# Patient Record
Sex: MALE | Race: WHITE
[De-identification: names, ages, dates, MRNs, and addresses within clinical notes are randomized per-mention and may not be internally consistent; named-entity substitution may affect disease eponyms.]

---

## 2017-07-11 ENCOUNTER — HOSPITAL ENCOUNTER (EMERGENCY)
Dept: HOSPITAL 45 - C.EDB | Age: 52
Discharge: HOME | End: 2017-07-11
Payer: COMMERCIAL

## 2017-07-11 VITALS
HEIGHT: 70.98 IN | BODY MASS INDEX: 39.91 KG/M2 | BODY MASS INDEX: 39.91 KG/M2 | HEIGHT: 70.98 IN | WEIGHT: 285.06 LBS | WEIGHT: 285.06 LBS

## 2017-07-11 VITALS
DIASTOLIC BLOOD PRESSURE: 92 MMHG | TEMPERATURE: 98.24 F | HEART RATE: 71 BPM | SYSTOLIC BLOOD PRESSURE: 171 MMHG | OXYGEN SATURATION: 96 %

## 2017-07-11 DIAGNOSIS — J32.9: Primary | ICD-10-CM

## 2017-07-11 DIAGNOSIS — Z82.49: ICD-10-CM

## 2017-07-11 DIAGNOSIS — Z83.6: ICD-10-CM

## 2017-07-11 DIAGNOSIS — K21.9: ICD-10-CM

## 2017-07-11 LAB
ALBUMIN/GLOB SERPL: 1.1 {RATIO} (ref 0.9–2)
ALP SERPL-CCNC: 83 U/L (ref 45–117)
ALT SERPL-CCNC: 85 U/L (ref 12–78)
ANION GAP SERPL CALC-SCNC: 6 MMOL/L (ref 3–11)
AST SERPL-CCNC: 55 U/L (ref 15–37)
BASOPHILS # BLD: 0.02 K/UL (ref 0–0.2)
BASOPHILS NFR BLD: 0.4 %
BUN SERPL-MCNC: 10 MG/DL (ref 7–18)
BUN/CREAT SERPL: 13.1 (ref 10–20)
CALCIUM SERPL-MCNC: 8.8 MG/DL (ref 8.5–10.1)
CHLORIDE SERPL-SCNC: 106 MMOL/L (ref 98–107)
CO2 SERPL-SCNC: 29 MMOL/L (ref 21–32)
COMPLETE: YES
CREAT CL PREDICTED SERPL C-G-VRATE: 151.6 ML/MIN
CREAT SERPL-MCNC: 0.79 MG/DL (ref 0.6–1.4)
EOSINOPHIL NFR BLD AUTO: 252 K/UL (ref 130–400)
GLOBULIN SER-MCNC: 3.4 GM/DL (ref 2.5–4)
GLUCOSE SERPL-MCNC: 98 MG/DL (ref 70–99)
HCT VFR BLD CALC: 43.6 % (ref 42–52)
IG%: 1 %
IMM GRANULOCYTES NFR BLD AUTO: 25.1 %
LYME DISEASE AB IGG: (no result)
LYME DISEASE AB IGM: (no result)
LYMPHOCYTES # BLD: 1.32 K/UL (ref 1.2–3.4)
MAGNESIUM SERPL-MCNC: 2.1 MG/DL (ref 1.8–2.4)
MCH RBC QN AUTO: 28.8 PG (ref 25–34)
MCHC RBC AUTO-ENTMCNC: 33.3 G/DL (ref 32–36)
MCV RBC AUTO: 86.7 FL (ref 80–100)
MONOCYTES NFR BLD: 11 %
NEUTROPHILS # BLD AUTO: 5.7 %
NEUTROPHILS NFR BLD AUTO: 56.8 %
PMV BLD AUTO: 10.2 FL (ref 7.4–10.4)
POTASSIUM SERPL-SCNC: 4 MMOL/L (ref 3.5–5.1)
RBC # BLD AUTO: 5.03 M/UL (ref 4.7–6.1)
SODIUM SERPL-SCNC: 141 MMOL/L (ref 136–145)
WBC # BLD AUTO: 5.26 K/UL (ref 4.8–10.8)

## 2017-07-11 NOTE — DIAGNOSTIC IMAGING REPORT
CT HEAD WITHOUT CONTRAST (CT)



CLINICAL HISTORY: Headaches behind R eye since Thursday    



COMPARISON STUDY:  No previous studies for comparison.



TECHNIQUE:  Axial CT of the brain is performed from the vertex to the skull

base. IV contrast was not administered for this examination.



CT DOSE: 537.48 mGy.cm



FINDINGS:



No intra or extra-axial mass lesions are visualized. There is no CT evidence of

acute cortical infarction. There is no evidence of midline shift. There is no

acute  hemorrhage. No calvarial fractures are visualized. 

   

There is no evidence of pathologic ventricular dilatation.

There is complete opacification the right frontal sinus. There is moderate

mucosal thickening within the left maxillary sinus. Multiple ethmoid air cells

are opacified. There is bilateral maxillary sinus mucosal thickening left

greater than right.



IMPRESSION: Paranasal sinus disease. Otherwise normal noncontrast head CT for

age.







Electronically signed by:  Gilbert Lim M.D.

7/11/2017 5:18 PM



Dictated Date/Time:  7/11/2017 5:16 PM

## 2017-07-11 NOTE — EMERGENCY ROOM VISIT NOTE
History


First contact with patient:  16:27


Chief Complaint:  HEADACHE


Stated Complaint:  HEADACHE





History of Present Illness


The patient is a 51 year old male who presents to the Emergency Room with 

complaints of severe headaches for the last 5 days.  The headaches are 

throbbing sensation behind his right eye.  The patient does not typically get 

headaches.  He reports getting maybe one per year up until a few days ago.  The 

patient has tried Excedrin, Tylenol and ibuprofen with minimal relief of the 

pain.  He denies any photophobia.  He denies any nausea or vomiting.  No neck 

pain.  The headaches do seem to improve when he lays flat.  They don't seem to 

be exacerbated with movement.  He denies any dizziness.  No recent changes in 

medications.  He reports eating and drinking normally over the last several 

days.  The patient was seen at urgent care yesterday.  He was given a shot of 

Toradol with fairly good pain relief.  He denies any recent illnesses such as 

fever, chills, cough, sinus pain or sore throat.





Review of Systems


10 system review performed and  negative unless noted in HPI or below





Past Medical/Surgical History


GERD





Family History


Emphysema


Hypertension





Social History


Smoking Status:  Never Smoker


Alcohol Use:  none


Marital Status:  


Housing Status:  lives with family


Occupation Status:  employed





Current/Historical Medications


Scheduled


Amoxicillin & Pot Clavulanate (Augmentin 875-125 mg), 1 TAB PO BID


Omeprazole (Prilosec), 40 MG PO DAILY


Paroxetine (Paxil), 40 MG PO DAILY





Scheduled PRN


Fluticasone Prop/Salmeterol (Advair Diskus 250/50 60 Dose), 1 PUFF INH BID PRN 

for Wheezing


Hydrocodone/Acetaminophen 5MG/325MG (Norco 5MG/325MG), 2 TABLETS PO Q4H PRN for 

Pain


Terazosin Hcl (Hytrin), 2 MG PO AS DIRECTED PRN for URINARY PROBLEMS





Allergies


Coded Allergies:  


     No Known Allergies (Verified , 7/11/17)





Physical Exam


Vital Signs











  Date Time  Temp Pulse Resp B/P (MAP) Pulse Ox O2 Delivery O2 Flow Rate FiO2


 


7/11/17 19:09 36.8 71 18 171/92 96   


 


7/11/17 17:47  74 18 142/81 98   


 


7/11/17 16:04 36.8 82 18 147/101 98 Room Air  











Physical Exam


VITALS: Vitals are noted on the nurse's note and reviewed by myself.  Vital 

signs stable.


GENERAL: 51-year-old male, in no acute distress, nondiaphoretic, well-developed 

well-nourished.


SKIN: The skin was without rashes, erythema, edema, or bruising. 


HEAD: Normocephalic atraumatic.  


EARS: External auditory canals clear, tympanic membranes pearly gray without 

erythema or effusion bilaterally.


EYES: Pupils equal round and reactive to light and accommodation.  Conjunctivae 

without injection, sclerae without icterus.  Extraocular movements intact.  


MOUTH: Mucous membranes moist.  Tonsils are not enlarged.  Pharynx without 

erythema or exudate.  Uvula midline.  Airway patent.  Tongue does not deviate.  


NECK: Supple without nuchal rigidity.  No lymphadenopathy. Cervical spine is 

nontender.  No JVD.


HEART: Regular rate and rhythm without murmurs gallops or rubs.


LUNGS: Clear to auscultation bilaterally without wheezes, rales or rhonchi.   

No accessory muscle use.


ABDOMEN: Positive bowel sounds x 4.Soft, nontender, without organomegaly. No 

guarding or rebound tenderness.


MUSCULOSKELETAL: No muscle atrophy, erythema, or edema noted. Strength 5/5 

throughout.


NEURO: Patient was alert and oriented to person place and time.  cerebellar 

functions in tact.  Normal sensation to touch. No focal neurological deficits.





Medical Decision & Procedures


ER Provider


Diagnostic Interpretation:





                                                                               

                                                                 


Patient Name: MARK CASTRO                               Unit Number:  

[id number]                  


 








 











Dictated: 07/11/17 1716


 


Transcribed: 07/11/17 1716


 


ARG


 


Printed Date/Time: [~ rep prt dt]/[~ rep prt tm]








 [~ rep ct labl] - [~ rep ct ivnm]


 





   [facility]


 Radiology Department


 [place] [address] [phone]





 











Dictated: 07/11/17 1716


 


Transcribed: 07/11/17 1716


 


ARG


 


Printed Date/Time: [~ rep prt dt]/[~ rep prt tm]








 [~ rep ct labl] - [~ rep ct ivnm]


 











Patient:  MARK CASTRO Address1:  [address] Rec:  [id number] Address2:  


 


Acct ID:  [id number] [facility] Zip:  [place] [address]


 


YOB: 1965          Sex:  M Room/Bed:  


 


Ref Phy:  Hilario Griffin M.D. SC: ERICA Ocampo Phy:   Report #:  [id number]


 


Jennifer Phy:  Hilario Griffin M.D. Test:  HWO


 


Admit Phy:   Technician:  SOFÍA


 


Interpreting Phy:  Gilbert Lim M.D. Diagnosis:  HEADACHE


 


Ordering Phy:  Virginia Kilgore PA-C Service Date:  07/11/17


 


Admit Date: 07/11/17 MNE: [id number]


 


 CONF:


 


 DICTATED BY: Gilbert Lim M.D.]]


 


CC: Jatinder Tamez M.D. Gabinskiy, Boris M.D. Urban, Angela P., PA-C


 


 Endcc:











[~ rep ct add3]]


CT HEAD WITHOUT CONTRAST (CT)





CLINICAL HISTORY: Headaches behind R eye since Thursday    





COMPARISON STUDY:  No previous studies for comparison.





TECHNIQUE:  Axial CT of the brain is performed from the vertex to the skull


base. IV contrast was not administered for this examination.





CT DOSE: 537.48 mGy.cm





FINDINGS:





No intra or extra-axial mass lesions are visualized. There is no CT evidence of


acute cortical infarction. There is no evidence of midline shift. There is no


acute  hemorrhage. No calvarial fractures are visualized. 


   


There is no evidence of pathologic ventricular dilatation.


There is complete opacification the right frontal sinus. There is moderate


mucosal thickening within the left maxillary sinus. Multiple ethmoid air cells


are opacified. There is bilateral maxillary sinus mucosal thickening left


greater than right.





IMPRESSION: Paranasal sinus disease. Otherwise normal noncontrast head CT for


age.











Electronically signed by:  Gilbert Lim M.D.


7/11/2017 5:18 PM





Dictated Date/Time:  7/11/2017 5:16 PM





 The status of this report is Signed.   


 Draft = Not yet reviewed or approved by Radiologist.  


 Signed = Reviewed and approved by Radiologist.


<AttendingPhy></AttendingPhy> <FamilyPhy>Hilario Griffin M.D.</FamilyPhy> <

PrimaryPhy>Hilario Griffin M.D.</PrimaryPhy> <UnitNumber>[id number]</UnitNumber

> <VisitNumber>[id number]</VisitNumber> <PatientName>MARK CASTRO</

PatientName> <DateOfBirth>1965</DateOfBirth> <Location>[facility]</Location> <

ServiceDate>07/11/17</ServiceDate> <MNE>ESINDI</MNE> <OrderingPhy>JameVirginia 

SREE FENTON</OrderingPhy> <OrderingPhyMNE>f rep ord dr parikh</OrderingPhyMNE> <

DictatingPhyMNE>f rep dict dr parikh</DictatingPhyMNE> <CCListMNE>f rep ct mne</

CCListMNE> <AdmittingPhyMNE>f pt admit dr parikh</AdmittingPhyMNE> <AttendingPhyMNE

>f pt attend dr parikh</AttendingPhyMNE>


<ConsultingPhyMNE>f pt consult dr parikh</ConsultingPhyMNE> <FamilyPhyMNE>f pt fam 

dr parikh</FamilyPhyMNE> <OtherPhyMNE>f pt other dr parikh</OtherPhyMNE> <

PrimaryPhyMNE>f pt prim care dr parikh</PrimaryPhyMNE> <ReferringPhyMNE>f pt 

referring dr parikh</ReferringPhyMNE>





Laboratory Results


7/11/17 16:45








Red Blood Count 5.03, Mean Corpuscular Volume 86.7, Mean Corpuscular Hemoglobin 

28.8, Mean Corpuscular Hemoglobin Concent 33.3, Mean Platelet Volume 10.2, 

Neutrophils (%) (Auto) 56.8, Lymphocytes (%) (Auto) 25.1, Monocytes (%) (Auto) 

11.0, Eosinophils (%) (Auto) 5.7, Basophils (%) (Auto) 0.4, Neutrophils # (Auto

) 2.99, Lymphocytes # (Auto) 1.32, Monocytes # (Auto) 0.58, Eosinophils # (Auto

) 0.30, Basophils # (Auto) 0.02





7/11/17 16:45

















Test


  7/11/17


16:45


 


White Blood Count


  5.26 K/uL


(4.8-10.8)


 


Red Blood Count


  5.03 M/uL


(4.7-6.1)


 


Hemoglobin


  14.5 g/dL


(14.0-18.0)


 


Hematocrit 43.6 % (42-52) 


 


Mean Corpuscular Volume


  86.7 fL


()


 


Mean Corpuscular Hemoglobin


  28.8 pg


(25-34)


 


Mean Corpuscular Hemoglobin


Concent 33.3 g/dl


(32-36)


 


Platelet Count


  252 K/uL


(130-400)


 


Mean Platelet Volume


  10.2 fL


(7.4-10.4)


 


Neutrophils (%) (Auto) 56.8 % 


 


Lymphocytes (%) (Auto) 25.1 % 


 


Monocytes (%) (Auto) 11.0 % 


 


Eosinophils (%) (Auto) 5.7 % 


 


Basophils (%) (Auto) 0.4 % 


 


Neutrophils # (Auto)


  2.99 K/uL


(1.4-6.5)


 


Lymphocytes # (Auto)


  1.32 K/uL


(1.2-3.4)


 


Monocytes # (Auto)


  0.58 K/uL


(0.11-0.59)


 


Eosinophils # (Auto)


  0.30 K/uL


(0-0.5)


 


Basophils # (Auto)


  0.02 K/uL


(0-0.2)


 


RDW Standard Deviation


  41.6 fL


(36.4-46.3)


 


RDW Coefficient of Variation


  13.1 %


(11.5-14.5)


 


Immature Granulocyte % (Auto) 1.0 % 


 


Immature Granulocyte # (Auto)


  0.05 K/uL


(0.00-0.02)


 


Anion Gap


  6.0 mmol/L


(3-11)


 


Est Creatinine Clear Calc


Drug Dose 151.6 ml/min 


 


 


Estimated GFR (


American) 120.5 


 


 


Estimated GFR (Non-


American 104.0 


 


 


BUN/Creatinine Ratio 13.1 (10-20) 


 


Calcium Level


  8.8 mg/dl


(8.5-10.1)


 


Magnesium Level


  2.1 mg/dl


(1.8-2.4)


 


Total Bilirubin


  0.3 mg/dl


(0.2-1)


 


Aspartate Amino Transf


(AST/SGOT) 55 U/L (15-37) 


 


 


Alanine Aminotransferase


(ALT/SGPT) 85 U/L (12-78) 


 


 


Alkaline Phosphatase


  83 U/L


()


 


Total Protein


  7.1 gm/dl


(6.4-8.2)


 


Albumin


  3.7 gm/dl


(3.4-5.0)


 


Globulin


  3.4 gm/dl


(2.5-4.0)


 


Albumin/Globulin Ratio 1.1 (0.9-2) 


 


Lipase


  89 U/L


()


 


Chemistry Specimen Hemolysis  


 


Lyme Disease IgG Antibody NEG (NEG) 


 


Lyme Disease IgM Antibody NEG (NEG) 











Medications Administered











 Medications


  (Trade)  Dose


 Ordered  Sig/Maria De Jesus


 Route  Start Time


 Stop Time Status Last Admin


Dose Admin


 


 Ketorolac


 Tromethamine


  (Toradol Inj)  30 mg  NOW  STAT


 IV  7/11/17 16:37


 7/11/17 16:39 DC 7/11/17 16:37


30 MG


 


 Sodium Chloride  1,000 ml @ 


 999 mls/hr  Q1H1M ONCE


 IV  7/11/17 16:45


 7/11/17 17:45 DC 7/11/17 16:45


999 MLS/HR


 


 Amoxicillin/


 Clavulanate


 Potassium


  (Augmentin Tab)  875 mg  NOW  ONCE


 PO  7/11/17 18:15


 7/11/17 18:16 DC 7/11/17 18:24


875 MG


 


 Morphine Sulfate


  (MoRPHine


 SULFATE INJ)  4 mg  ONE  STAT


 IV  7/11/17 18:05


 7/11/17 18:08 DC 7/11/17 18:24


4 MG


 


 Ondansetron HCl


  (Zofran Inj)  4 mg  NOW  STAT


 IV  7/11/17 18:05


 7/11/17 18:08 DC 7/11/17 18:24


4 MG











ED Course


Patient was seen and examined


Vital signs including  blood pressure were reviewed.  The patient's blood 

pressure was noted to be elevated.  I recommended that he follow-up with his 

primary care physician to recheck his blood pressure


medications list was verified with patient


Labs were obtained, and a saline lock was established


The patient was given 1 dose of Toradol 30 mg IV.  He was also hydrated with 1 

L of normal saline.


Imaging was performed and reviewed.


Upon reevaluation, the patient was still complaining of 6/10 pain.  He was 

given morphine 4 mg IV and Zofran 4 mg IV.  He was also given 1 dose of 

Augmentin by mouth


Upon reevaluation, the patient was feeling much better.  We discussed the 

results of his workup.  He voiced understanding.  He was comfortable being 

discharged home.


I reviewed discharge instructions the patient.  They voiced understanding and 

had no further questions.





Medical Decision


Differential includes: Acute intracranial bleed, trauma, meningitis, 

encephalitis, increased intracranial pressure, mass or mass effect, facial or 

dental infection, temporal arteritis, CVA, TIA, acute hypertensive emergency, 

sinusitis, carbon monoxide exposure.





This patient is a pleasant 51-year-old male that presented to the emergency 

department with complaints of frequent headaches over the last 4-5 days.  The 

patient had no other complaints of infectious etiology.  He was afebrile.  

Since headaches are unusual for him, I elected to perform a CT of the head.  He 

has fairly significant sinusitis.  There are no signs of intracranial 

hemorrhage.  I believe his pain is likely secondary to his sinusitis.  He was 

started on Augmentin.  He was also given a short course of narcotics.  He was 

instructed to follow-up with his primary care physician within one week for 

recheck.  He agreed to return to the emergency department with any new or 

worsening symptoms.  He was also informed to discuss his blood pressure at his 

next primary care doctor's appointment.





Impression





 Primary Impression:  


 Sinusitis





Departure Information


Prescriptions





Amoxicillin & Pot Clavulanate (Augmentin 875-125 mg) 1 Tab Tab


1 TAB PO BID, #20 TAB


   Prov: Virginia Kilgore PA-C         7/11/17 


Hydrocodone/Acetaminophen 5MG/325MG (Norco 5MG/325MG)  Tab


2 TABLETS PO Q4H Y for Pain, #15 TAB


   Prov: Virginia Kilgore PA-C         7/11/17





Referrals


Hilario Griffin M.D. (PCP)





Patient Instructions


My [facility]

## 2017-09-07 ENCOUNTER — HOSPITAL ENCOUNTER (OUTPATIENT)
Dept: HOSPITAL 45 - C.RDSM | Age: 52
Discharge: HOME | End: 2017-09-07
Attending: PHYSICIAN ASSISTANT
Payer: COMMERCIAL

## 2017-09-07 DIAGNOSIS — R52: Primary | ICD-10-CM

## 2017-09-07 NOTE — DIAGNOSTIC IMAGING REPORT
AP STANDING VIEW OF BOTH KNEES; 3 VIEWS RIGHT KNEE



CLINICAL HISTORY: Chronic right knee pain.



FINDINGS: An AP standing view of both knees with lateral, tunnel, and sunrise

views of the right knee are compared to study dated 4/13/2015. The skeletal

structures are osteopenic. No fracture is seen. There is moderate to advanced

tricompartmental degenerative joint space narrowing. This is greatest in the

medial and patellofemoral compartments. There are large marginal osteophytes and

patellar enthesophytes. There is no osteochondral defect seen on the tunnel

image. A small joint effusion is suspected. Prepatellar soft tissue swelling is

noted. Survey images of the left knee on the frontal view show moderate

narrowing in the medial compartment and mild narrowing the lateral compartment

as well as marginal osteophytes.



IMPRESSION:



1. Small joint effusion and soft tissue swelling with no acute bony abnormality

seen in the right knee.



2. Osteopenia and arthritic change as above. This has modestly progressed from

the 2015 examination.



3. Arthritic change is also seen in the left knee on the frontal view.







Electronically signed by:  Jatinder Salazar M.D.

9/7/2017 4:02 PM



Dictated Date/Time:  9/7/2017 3:59 PM

## 2017-12-21 LAB
BASOPHILS # BLD: 0.03 K/UL (ref 0–0.2)
BASOPHILS NFR BLD: 0.5 %
BUN SERPL-MCNC: 20 MG/DL (ref 7–18)
CALCIUM SERPL-MCNC: 8.9 MG/DL (ref 8.5–10.1)
CO2 SERPL-SCNC: 27 MMOL/L (ref 21–32)
CREAT SERPL-MCNC: 0.67 MG/DL (ref 0.6–1.4)
EOS ABS #: 0.26 K/UL (ref 0–0.5)
EOSINOPHIL NFR BLD AUTO: 233 K/UL (ref 130–400)
GLUCOSE SERPL-MCNC: 90 MG/DL (ref 70–99)
HCT VFR BLD CALC: 42 % (ref 42–52)
HGB BLD-MCNC: 14.1 G/DL (ref 14–18)
IG#: 0.04 K/UL (ref 0–0.02)
IMM GRANULOCYTES NFR BLD AUTO: 21.9 %
INR PPP: 1 (ref 0.9–1.1)
LYMPHOCYTES # BLD: 1.24 K/UL (ref 1.2–3.4)
MCH RBC QN AUTO: 29.3 PG (ref 25–34)
MCHC RBC AUTO-ENTMCNC: 33.6 G/DL (ref 32–36)
MCV RBC AUTO: 87.3 FL (ref 80–100)
MONO ABS #: 0.48 K/UL (ref 0.11–0.59)
MONOCYTES NFR BLD: 8.5 %
NEUT ABS #: 3.62 K/UL (ref 1.4–6.5)
NEUTROPHILS # BLD AUTO: 4.6 %
NEUTROPHILS NFR BLD AUTO: 63.8 %
PMV BLD AUTO: 10.6 FL (ref 7.4–10.4)
POTASSIUM SERPL-SCNC: 4 MMOL/L (ref 3.5–5.1)
PTT PATIENT: 27.6 SECONDS (ref 21–31)
RED CELL DISTRIBUTION WIDTH CV: 13 % (ref 11.5–14.5)
RED CELL DISTRIBUTION WIDTH SD: 41.8 FL (ref 36.4–46.3)
SODIUM SERPL-SCNC: 137 MMOL/L (ref 136–145)
WBC # BLD AUTO: 5.67 K/UL (ref 4.8–10.8)

## 2017-12-21 NOTE — PAT MEDICATION INSTRUCTIONS
Service Date


Dec 21, 2017.





Current Home Medication List


Fluticasone Prop/Salmeterol (Advair Diskus 250/50 60 Dose), 1 PUFF INH BID PRN 

for Wheezing


Omeprazole (Prilosec), 40 MG PO HS


Paroxetine (Paxil), 40 MG PO HS


Potassium (Potassium), 1 TAB PO HS PRN for PRN





Medication Instructions


For Your Scheduled Surgery 








- Hold the following medications the morning of surgery:


Potassium (Potassium), 1 TAB PO HS PRN for PRN





- Take the following medications the morning of surgery:


Fluticasone Prop/Salmeterol (Advair Diskus 250/50 60 Dose), 1 PUFF INH BID PRN 

for Wheezing (if needed)





- Take the following medications as scheduled the night before surgery:


Potassium (Potassium), 1 TAB PO HS PRN for PRN


Paroxetine (Paxil), 40 MG PO HS


Fluticasone Prop/Salmeterol (Advair Diskus 250/50 60 Dose), 1 PUFF INH BID PRN 

for Wheezing (if needed)


Omeprazole (Prilosec), 40 MG PO HS








If you have any questions please call us at [phone] or [phone] or 

[phone]

## 2017-12-21 NOTE — DIAGNOSTIC IMAGING REPORT
CHEST 2 VIEWS ROUTINE



CLINICAL HISTORY: 52 years-old Male presenting with preadmission chest x-ray,

asymptomatic. 



TECHNIQUE: PA and lateral views of the chest were obtained.



COMPARISON: None.



FINDINGS:

Cardiomediastinal silhouette normal. Lungs and pleural spaces clear.

Degenerative changes of the thoracic spine. Mildly exaggerated thoracic kyphosis

without focal compression deformity. Upper abdomen normal.



IMPRESSION:

1.  No acute cardiopulmonary disease.







Electronically signed by:  Aguilar Rodriguez M.D.

12/21/2017 3:29 PM



Dictated Date/Time:  12/21/2017 3:29 PM

## 2017-12-27 NOTE — HISTORY & PHYSICAL EXAMINATION
DATE OF ADMISSION:  01/03/2018

 

HISTORY OF PRESENT ILLNESS:  This 52-year-old white male presents to the

office with complaints of right knee pain for several years.  Pain has become

worse with time.  He initially did well with viscosupplementation, but it did

not help his pain after this last series.  He is now frustrated.  He is

having night pain.  He is having difficulty with activities of daily living. 

Pain is worse with ambulation.  No swelling, catching, locking, or buckling. 

Preoperative imaging has been obtained.  He elects to proceed with right

total knee arthroplasty in hopes of alleviating his pain.

 

PAST MEDICAL HISTORY:  Significant for  elevated cholesterol, obesity, GERD,

kidney stones, BPH.

 

PREVIOUS SURGERIES:  Right shoulder arthroscopy 2000.

 

SOCIAL HISTORY:  The patient is employed.  .  No tobacco use,

occasional ETOH use.

 

FAMILY HISTORY:  Noncontributory.

 

ALLERGIES:  NKDA.

 

CURRENT MEDICATIONS:  Omeprazole 20 mg 2 tablets daily, paroxetine 40 mg p.o.

daily, Tylenol p.r.n.

 

REVIEW OF SYSTEMS:  Significant for above stated conditions, otherwise

unremarkable.

 

PHYSICAL EXAMINATION:

GENERAL:  Well-developed, well-nourished middle aged white male in no acute

distress.  Sitting on a chair.  Alert and oriented.

SKIN:  Warm and dry with good turgor.  No rashes or lesions.  No ecchymosis

or erythema.

HEAD, EYES, EARS, NOSE, AND THROAT:  Normocephalic, atraumatic.  Eyes PERRLA,

EOMI.  Nares patent bilaterally without turbinate enlargement.  Oropharynx

without erythema or exudate.  No lesions noted.  Uvula midline.  Oral mucosa

moist.  Fair dentition.  Dental fillings are noted.

HEART:  RRR.  No MGR.

LUNGS:  Clear to auscultation bilaterally.  No crackles, rhonchi or wheezing.

 Good air movement.

ABDOMEN:  Bowel sounds present x4, soft, nontender.  No organomegaly.  No

masses.  Obese.

MUSCULOSKELETAL:  Right knee has no intraarticular effusion.  No redness or

warmth.  Full terminal extension.  Flexion to greater than 100 degrees. 

Strength is 5/5 with good quad tone.  He has discomfort with palpation over

the medial joint line.  There is also some mild lateral joint line discomfort

today.  No peripatellar discomfort.  Ambulatory with a slightly antalgic

gait.  No defect in the patellar tendon or quadriceps tendon.  Intact motor

function in the ankle.

NEUROLOGIC:  Cranial nerves II through XII are intact.  Gross sensation is

intact across the upper and lower extremities via soft touch.

 

DATA:  Radiographic imaging previously obtained shows end-stage DJD of the

right knee.  Varus alignment.  Periarticular osteophytes, subchondral

sclerosis, and joint space narrowing are all present.

 

IMPRESSION:  Right knee end-stage degenerative joint disease.

 

PLAN:  Informed written consent has been obtained to proceed with right total

knee arthroplasty.  Postoperative prescriptions for Coumadin and Percocet

will be provided at discharge from the hospital.  Anticipate discharge to

home with home health services.  The patient requests crutches

postoperatively.  These were provided and sized.  Follow up 2 weeks

postoperative for staple removal.  Medical clearance has been requested from

his PCP, Dr. Griffin.   Preoperative lab work, EKG, and chest x-ray have

been ordered.

## 2018-01-03 ENCOUNTER — HOSPITAL ENCOUNTER (INPATIENT)
Dept: HOSPITAL 45 - C.ACU | Age: 53
LOS: 1 days | Discharge: HOME HEALTH | DRG: 470 | End: 2018-01-04
Attending: ORTHOPAEDIC SURGERY | Admitting: ORTHOPAEDIC SURGERY
Payer: COMMERCIAL

## 2018-01-03 VITALS
DIASTOLIC BLOOD PRESSURE: 90 MMHG | HEART RATE: 99 BPM | TEMPERATURE: 98.78 F | SYSTOLIC BLOOD PRESSURE: 115 MMHG | OXYGEN SATURATION: 97 %

## 2018-01-03 VITALS
HEART RATE: 77 BPM | SYSTOLIC BLOOD PRESSURE: 120 MMHG | TEMPERATURE: 97.52 F | OXYGEN SATURATION: 97 % | DIASTOLIC BLOOD PRESSURE: 76 MMHG

## 2018-01-03 VITALS
OXYGEN SATURATION: 95 % | DIASTOLIC BLOOD PRESSURE: 68 MMHG | HEART RATE: 107 BPM | TEMPERATURE: 98.06 F | SYSTOLIC BLOOD PRESSURE: 105 MMHG

## 2018-01-03 VITALS
DIASTOLIC BLOOD PRESSURE: 75 MMHG | TEMPERATURE: 97.7 F | HEART RATE: 90 BPM | OXYGEN SATURATION: 95 % | SYSTOLIC BLOOD PRESSURE: 114 MMHG

## 2018-01-03 VITALS
SYSTOLIC BLOOD PRESSURE: 117 MMHG | TEMPERATURE: 97.7 F | DIASTOLIC BLOOD PRESSURE: 77 MMHG | HEART RATE: 99 BPM | OXYGEN SATURATION: 99 %

## 2018-01-03 VITALS
DIASTOLIC BLOOD PRESSURE: 71 MMHG | HEART RATE: 91 BPM | OXYGEN SATURATION: 97 % | SYSTOLIC BLOOD PRESSURE: 106 MMHG | TEMPERATURE: 97.88 F

## 2018-01-03 VITALS
HEIGHT: 71 IN | HEIGHT: 71 IN | WEIGHT: 293.88 LBS | BODY MASS INDEX: 41.14 KG/M2 | BODY MASS INDEX: 41.14 KG/M2 | WEIGHT: 293.88 LBS

## 2018-01-03 VITALS
DIASTOLIC BLOOD PRESSURE: 82 MMHG | HEART RATE: 104 BPM | OXYGEN SATURATION: 94 % | TEMPERATURE: 98.6 F | SYSTOLIC BLOOD PRESSURE: 120 MMHG

## 2018-01-03 VITALS
SYSTOLIC BLOOD PRESSURE: 115 MMHG | OXYGEN SATURATION: 97 % | TEMPERATURE: 97.88 F | DIASTOLIC BLOOD PRESSURE: 76 MMHG | HEART RATE: 97 BPM

## 2018-01-03 VITALS
DIASTOLIC BLOOD PRESSURE: 78 MMHG | HEART RATE: 110 BPM | OXYGEN SATURATION: 94 % | TEMPERATURE: 98.42 F | SYSTOLIC BLOOD PRESSURE: 120 MMHG

## 2018-01-03 DIAGNOSIS — M17.11: Primary | ICD-10-CM

## 2018-01-03 DIAGNOSIS — E66.9: ICD-10-CM

## 2018-01-03 DIAGNOSIS — K21.9: ICD-10-CM

## 2018-01-03 DIAGNOSIS — D62: ICD-10-CM

## 2018-01-03 DIAGNOSIS — N40.0: ICD-10-CM

## 2018-01-03 DIAGNOSIS — Z87.442: ICD-10-CM

## 2018-01-03 PROCEDURE — 0SRC0J9 REPLACEMENT OF RIGHT KNEE JOINT WITH SYNTHETIC SUBSTITUTE, CEMENTED, OPEN APPROACH: ICD-10-PCS | Performed by: ORTHOPAEDIC SURGERY

## 2018-01-03 RX ADMIN — OXYCODONE HYDROCHLORIDE PRN MG: 5 TABLET ORAL at 20:00

## 2018-01-03 RX ADMIN — SODIUM CHLORIDE SCH MG: 9 INJECTION INTRAMUSCULAR; INTRAVENOUS; SUBCUTANEOUS at 23:41

## 2018-01-03 RX ADMIN — CEFAZOLIN SCH MLS/MIN: 10 INJECTION, POWDER, FOR SOLUTION INTRAVENOUS at 23:41

## 2018-01-03 RX ADMIN — SODIUM CHLORIDE SCH MG: 9 INJECTION INTRAMUSCULAR; INTRAVENOUS; SUBCUTANEOUS at 18:09

## 2018-01-03 RX ADMIN — MORPHINE SULFATE PRN MG: 4 INJECTION, SOLUTION INTRAMUSCULAR; INTRAVENOUS at 16:38

## 2018-01-03 RX ADMIN — CEFAZOLIN SCH MLS/MIN: 10 INJECTION, POWDER, FOR SOLUTION INTRAVENOUS at 16:26

## 2018-01-03 RX ADMIN — OXYCODONE HYDROCHLORIDE PRN MG: 5 TABLET ORAL at 23:42

## 2018-01-03 RX ADMIN — SODIUM CHLORIDE SCH MG: 9 INJECTION INTRAMUSCULAR; INTRAVENOUS; SUBCUTANEOUS at 12:16

## 2018-01-03 RX ADMIN — MORPHINE SULFATE PRN MG: 4 INJECTION, SOLUTION INTRAMUSCULAR; INTRAVENOUS at 21:28

## 2018-01-03 RX ADMIN — OXYCODONE HYDROCHLORIDE PRN MG: 5 TABLET ORAL at 13:50

## 2018-01-03 RX ADMIN — DOCUSATE SODIUM SCH MG: 100 CAPSULE, LIQUID FILLED ORAL at 20:01

## 2018-01-03 NOTE — DIAGNOSTIC IMAGING REPORT
R KNEE 1 OR 2 VIEWS ROUTINE



CLINICAL HISTORY: AP/LATERAL IN PACU RIGHT KNEE postoperative evaluation



COMPARISON: None.



DISCUSSION: Anatomic alignment status post right knee total arthroplasty. Good

contact between prosthetic and the

Bone. Surgical drains are in position. Expected soft tissue postoperative change



IMPRESSION: Anatomic alignment status post total right knee arthroplasty











The above report was generated using voice recognition software.  It may contain

grammatical, syntax or spelling errors.







Electronically signed by:  Shay Escoto M.D.

1/3/2018 12:39 PM



Dictated Date/Time:  1/3/2018 12:38 PM

## 2018-01-03 NOTE — ANESTHESIOLOGY PROGRESS NOTE
Anesthesia Post Op Note


Date & Time


Lupillo 3, 2018 at 10:55





Vital Signs


Pain Intensity:  0





Vital Signs Past 12 Hours








  Date Time  Temp Pulse Resp B/P (MAP) Pulse Ox O2 Delivery O2 Flow Rate FiO2


 


1/3/18 10:50  98 14 114/71 99 Nasal Cannula 2 


 


1/3/18 10:40 36.9 96 14 100/72 98 Nasal Cannula 2 


 


1/3/18 10:30  115 17 107/73 97 Nasal Cannula 2 


 


1/3/18 10:22 36.7 129 17 105/71 96 Nasal Cannula 2 


 


1/3/18 06:39 37.1 99 20 115/90 97 Room Air  











Notes


Mental Status:  alert / awake / arousable, participated in evaluation


Pt Amnestic to Procedure:  Yes


Nausea / Vomiting:  adequately controlled


Pain:  adequately controlled


Airway Patency, RR, SpO2:  stable & adequate


BP & HR:  stable & adequate


Hydration State:  stable & adequate


Neuraxial Anesthesia:  was administered, sensory block is resolving


Anesthetic Complications:  no major complications apparent

## 2018-01-03 NOTE — HISTORY & PHYSICAL BRIDGE NOTE
H&P Re-Evaluation


Bridge Note:


I have examined the patient, reviewed the History & Physical and in the 

interval since the performance of the History & Physical I have noted the 

following changes of clinical significance:consent reviewed. No changes noted

## 2018-01-03 NOTE — [facility] OPERATIVE REPORT
Operative Report


Operative Date


Lupillo 3, 2018.





Pre-Operative Diagnosis





Right Knee End-Stage Degenerative Joint Disease





Post-Operative Diagnosis





Right Knee End-Stage Degenerative Joint Disease





Procedure(s) Performed





Right Total Knee Arthroplasty





Surgeon


Dr. Byrd





Assistant Surgeon(s)


OK Shrestha





Estimated Blood Loss


75 ml





Findings


DJD right knee





Fluids


1800cc





Specimens





A. Right Knee Bone and Tissue





Drains


none





Anesthesia


spinal/block





Complication(s)


None





Disposition


Recovery Room / PACU (stable)





Indications


Patient is a 52-year-old male who has been treated as an outpatient for many 

years for right knee pain.  His knee pain has progressively worsened over the 

last few months.  He has failed conservative treatment which is included 

injections and physical therapy.  His pain is interfering with all of his daily 

activities.  Surgical intervention was discussed and he wished to proceed with 

surgery.  Risks and complications were discussed and informed consent was 

obtained.  Surgery was scheduled.





Description of Procedure


Patient was taken to the operating room, was under spinal sedation with 

peripheral nerve block.  He was given 3 g of IV Ancef for surgical prophylaxis.

  Time out was performed preoperatively.  I was present during the entire case, 

please see Dr. Byrd's operative report for further detail.  Patient 

was awakened and taken to the recovery room in stable condition.


I attest to the content of the Intraoperative Record and any orders documented 

therein.  Any exceptions are noted below.

## 2018-01-03 NOTE — DISCHARGE INSTRUCTIONS
Discharge Instructions


Date of Service


Lupillo 3, 2018.





Admission


Reason for Admission:  Right Knee Degenerative Joint Disease





Discharge


Discharge Diagnosis / Problem:  Right knee DJD





Discharge Goals


Goal(s):  Decrease discomfort, Improve function, Increase independence





Activity Recommendations


Activity Limitations:  per Instructions/Follow-up section


Weightbearing Status:  Right weightbearing (as tolerated)





.





Instructions / Follow-Up


Instructions / Follow-Up





New Medicine:  





*  You will likely be taking one or more of these medications:


     


   1.  Percocet - Take, as directed, when you need it, every


        four to six hours to control your pain.


   2.  Iron Sulfate - Take three times each day for the month after surgery to 


        help you replace the blood lost during surgery.


   3.  Coumadin - Thins your blood to lessen the chance of forming a blood clot.


        The dose of this is different for each person and is based on your 

blood 


        tests that are done twice a week.





*  The most common side effects of pain medicine and iron are nausea and 

constipation.


    If nausea or constipation is too much of a problem or if you have any 

questions about


    your new medicines or doses, call [facility] at [phone].  

We


    will try to help you manage these issues.








VERY IMPORTANT TO READ AND REVIEW"





Blood Clots and Blood Thinning Medicine:





*  You are given Coumadin during the immediate post-operative period to lessen 

the risk of


    blood clots forming in your legs and/or lungs.  Coumadin is usually given 

for six weeks after


    surgery.


*  The prescription is for 2 mg tablets.  At discharge, you should understand 

your dose and 


    take it all at the same time every day, preferably after dinner.   


*  You need to get your blood checked 1 - 2 times per week for six weeks or as 

directed. 


*  If your dose needs to change, we will call you. Do not take your medication 

on the day of the blood


    test until we call you.





Pain:





*  The immediate post-operative period after knee replacement surgery is often 

quite painful.


*  You are given a prescription for pain medicine.  You should take it, as 

directed, when you 


    need it, especially before physical therapy and before going to bed.  Pain 

that interferes


    with sleep is very common and can last several months.


*  You will likely need pain medicine for the first four to six weeks.  It will 

not stop all of the


    pain.  The pain will lessen and as you feel better, you may change to 

milder pain medicine


    such as Tylenol.  


*  The most common side effects of pain medicine are nausea and constipation, 

so don't take


    more than you need.








Physical Therapy:





*  You will have physical therapy two or three times each week for four to six 

weeks after


    your surgery in order to regain your knee range of motion and to retrain 

your knee


    to work properly.  


*  It is just as important to make sure you are getting your knee perfectly 

straight as


    it is to regain your knee bend.


*  Taking a pain pill an hour before therapy can help you have a more 

productive and 


    comfortable therapy session if needed.





Home Exercise:





*  You were shown a series of exercises (heel props, heel slides, etc.) in the 

hospital.


    Do these exercises three to four times each day including the exercises you 

were 


    shown in physical therapy.





Walking:





*  Get up and walk several times each day.  For the first four weeks, try not 

to stand or


    walk for more than one hour at a time.  If you do stand or walk for more 

than one hour,


    you will not hurt anything, but your knee and leg will likely swell.  


*  As you feel comfortable, you may change from the walker or crutches to a 

cane and 


    then to independent walking.





SELF CARE INSTRUCTIONS AFTER TOTAL KNEE REPLACEMENT





A.  You may need to continue a physical therapy program after discharge from


     the hospital.  There are several options available to you.  Your doctor 

will


      assist you in selecting the best one for you.


   1.  An out-patient facility 2 to 3 times a week for therapy or home therapy.


   2.  Continue working on all exercises taught to you in the hospital.  Your


                  goals should be to increase bending of your knee to 90 

degrees and


              beyond and to fully straighten your knee.





B.  You may progress at your own pace from walking with a walker or crutches


      to a cane; then to no assistive devices.





C.  Make walking a part of your daily routine.  Be up as much as comfortable 


     with rest periods throughout the day.  Rest with leg elevation is very 

important.


      Use the ice wrap frequently for the first 3-4 weeks.





D.  There are no restrictions on activities.  You may ride in a car, shop, 

participate


      in household chores and all social activities.





E.  Wear the long elastic stockings (DOMINIQUE hose) 20 hours a day for six weeks 

after surgery.  


     They can be removed several times a day for laundering and for a shower.





F.  Do not place a pillow behind your knee when resting.  A pillow at your 

ankle is okay.








**VERY IMPORTANT TO READ AND REVIEW**





A.  Take Coumadin, Aspirin or Lovenox (blood thinning medications) as directed 

by your


     doctor.  If on Coumadin, have a pro-time (blood test) drawn according to 

your doctor's 


     instructions.  This will tell the doctor how well the Coumadin is thinning 

your blood.





   1.  YOU WILL BE GIVEN AN ORDER AT DISCHARGE FOR PT/INR (BLOOD WORK).  


        PLEASE HAVE THIS DONE AS INSTRUCTED.  PLEASE CALL OUR OFFICE


        AFTER YOUR BLOODWORK IS COMPLETE SO WE CAN TRACK YOUR RESULTS.  


        IF YOU ARE GOING TO OUTPATIENT PHYSICAL THERAPY, YOU WILL


        NEED TO GO TO OUTPATIENT TESTING TO HAVE IT DRAWN.





B.  There are a few signs you need to watch for after you are home.  Call 


     [facility] if you notice any of the followin.  Increased severe knee pain.  Some pain is expected especially


              when you exercise.


   2.  Increased swelling in your leg or knee; pain or swelling of the


              calf muscle in either lower leg.


   3.  Any fluid drainage from the incision.


   4.  Shortness of breath or chest pain.





C.  Please call [facility] at [phone] if you have any 


     concerns or questions about your operation or recovery.  The doctor or his 


     nurse will return your call promptly.





D.  You must take antibiotics before dental work, bladder, bowel or other 

surgery.  


     Call the office to obtain a prescription at least 2 days prior to your 

appointment.





*  CALL IF INCREASED PAIN, REDNESS, DRAINAGE OR FEVER GREATER THAT 101.





*  Sutures should be removed 12-14 days after surgery unless you are on chronic 


    steriods, then it will be 14-18 days after surgery.





Call your doctor if:





*  Temperature above 101 degrees F.


*  Pain not relieved by pain medicine ordered.


*  Increased drainage or redness from incision.


*  Notify your doctor with any questions or concerns.





Follow up:  





You have a follow up appointment on 18 at 12:45 p.m. with Jass BREWER





Current Hospital Diet


Patient's current hospital diet: Regular Diet





Discharge Diet


Recommended Diet:  Regular Diet





Procedures


Procedures Performed:  


Right Total Knee Arthroplasty





Pending Studies


Studies pending at discharge:  no





Medical Emergencies








.


Who to Call and When:





Medical Emergencies:  If at any time you feel your situation is an emergency, 

please call 911 immediately.





.





Non-Emergent Contact


Non-Emergency issues call your:  Surgeon


Call Non-Emergent contact if:  temperature is above 101, your pain is not 

controlled, your pain is worsening, your pain is unusual for you, your pain is 

concerning you, wound has increased drainage, wound has increased redness, 

wound has increased pain, you have any medication questions


.








"Provider Documentation" section prepared by Aletha Ramos.








.





VTE Core Measure


Inpt VTE Proph given/why not?:  Warfarin (Coumadin) (x 6 weeks post op), T.E.D. 

Stockings





[place] Drug Monitoring Program


Search Results:  patient reviewed within database, no issues identified

## 2018-01-03 NOTE — [facility] POST OPERATIVE BRIEF NOTE
Immediate Operative Summary


Operative Date


Lupillo 3, 2018.





Pre-Operative Diagnosis





Right Knee End-Stage Degenerative Joint Disease





Post-Operative Diagnosis





Right Knee End-Stage Degenerative Joint Disease





Procedure(s) Performed





Right Total Knee Arthroplasty





Surgeon


Dr. Byrd





Assistant Surgeon(s)


OK Shrestha





Estimated Blood Loss


75 ml





Findings


medial severe/pfj moderate





Fluids (cc crystalloids)


1800cc





Specimens





A. Right Knee Bone and Tissue





Drains


none





Anesthesia


spinal/block





Complication(s)


None





Disposition


Recovery Room / PACU

## 2018-01-03 NOTE — PROGRESS NOTE
DATE: 01/03/2018

 

SUBJECTIVE:  Postop check right total knee replacement, doing well, has no

major issues, can easily bend the knee to 80 degrees and can do a straight

leg raise.  Neurovascular check is normal, femoral sciatic nerve has no

bleeding.  Age well and voided well.  At this point in time, will Hep-Lock

IV, mobilize.  Will potentially discharge tomorrow.

 

Post x-rays look excellent.

## 2018-01-03 NOTE — OPERATIVE REPORT
DATE OF OPERATION:  01/03/2018

 

PREOPERATIVE DIAGNOSIS:  Osteoarthritis with varus and flexion deformity2,

right knee.

 

POSTOPERATIVE DIAGNOSIS:  Same.

 

OPERATION PERFORMED:  Cemented right total knee replacement.

 

SURGEON:  Dr. Byrd.

 

ASSISTANT:  Dr. Ramos.  No resident or fellow available.

 

PERIOPERATIVE SITUATION:  Medically cleared male who has failed conservative

treatment for years and is requesting a total knee replacement.  Please see

consent for risks and benefits.  At this point in time, he wants to proceed

with surgery.

 

DESCRIPTION OF PROCEDURE:  The patient appropriately identified, site

verified, consent verified, 3 grams of Ancef confirmed as being given.  The

right lower extremity was prepped and draped in usual routine fashion. 

Tourniquet was inflated to 300 mmHg for a total of approximately 51 minutes

after the leg was exsanguinated with a rubber Esmarch bandage.  Midline

exposure utilized and appropriate soft tissue releases and synovectomy

completed.  Osteophytes resected.  Distal femur resected 14 mm.  Proximal

tibia resected 4 mm.  The extension gap was excellent.  The femur was then

sized to a 5.  It was then appropriately templated with cutting block and the

anterior and posterior condylar and chamfer cuts made.  The flexion gap was

excellent.  Two injections of the Orthomix was injected.  The box cut was

then completed after the flexion gap was checked and the size 5 trial fit

well.  The tibia was then broached and reamed to a size 5 with excellent

coverage.  There was no major overhang.  With a 10 mm spacer, the knee was

stable in full flexion, mid range flexion and full flexion.  Also, full

extension.

 

The patella was sized to a 41, it was resected leaving 16 mm, osteophytes

were removed and the oval domed 3 pegged patella trial seated well and

tracked well.  The wound was then irrigated with Betadine was injected with

the Orthomix after it was irrigated with Pulsavac and then the trials were

removed.  The permanent cemented into position.  After 12 minutes, the

tourniquet was deflated, bleeding controlled.  There was minor bleeding

estimated blood loss 75 mL.  After 14, his knee was flexed, the trial spacer

removed.  There was no cement removal required.  The wound was irrigated with

Pulsavac with Betadine a Pulsavac and then the permanent liner seated.  The

knee reduced and closed with #1 Ethibond, #1 Vicryl, 2-0 Vicryl and stainless

steel clips.  Appropriate dressing applied and the patient transferred to

recovery room in satisfactory condition having tolerated the procedure well. 

Pathology pending on the bone specimens.

 

ESTIMATED BLOOD LOSS:  75 mL.

 

CRYSTALLOID:  1800 mL.  DVT prophylaxis with Coumadin.

 

SUMMARY OF IMPLANTS:  Size 5 posterior cruciate substituting femur, size 5

rotating tibial platform tray, oval domed 3 pegged patella size 41 tibial

insert rotating platform, posterior cruciate stabilized size 5, 10 mm thick,

2 bags of Palacos G cement.

 

 

I attest to the content of the Intraoperative Record and any orders documented therein. Any exception
s are noted below.

## 2018-01-04 VITALS
OXYGEN SATURATION: 94 % | TEMPERATURE: 98.24 F | DIASTOLIC BLOOD PRESSURE: 69 MMHG | SYSTOLIC BLOOD PRESSURE: 112 MMHG | HEART RATE: 93 BPM

## 2018-01-04 VITALS — HEART RATE: 102 BPM | TEMPERATURE: 97.88 F | OXYGEN SATURATION: 99 %

## 2018-01-04 VITALS
TEMPERATURE: 97.88 F | DIASTOLIC BLOOD PRESSURE: 67 MMHG | HEART RATE: 102 BPM | SYSTOLIC BLOOD PRESSURE: 105 MMHG | OXYGEN SATURATION: 99 %

## 2018-01-04 VITALS — DIASTOLIC BLOOD PRESSURE: 83 MMHG | SYSTOLIC BLOOD PRESSURE: 125 MMHG

## 2018-01-04 LAB
BUN SERPL-MCNC: 16 MG/DL (ref 7–18)
CALCIUM SERPL-MCNC: 8.7 MG/DL (ref 8.5–10.1)
CO2 SERPL-SCNC: 29 MMOL/L (ref 21–32)
CREAT SERPL-MCNC: 0.82 MG/DL (ref 0.6–1.4)
EOSINOPHIL NFR BLD AUTO: 204 K/UL (ref 130–400)
GLUCOSE SERPL-MCNC: 112 MG/DL (ref 70–99)
HCT VFR BLD CALC: 34.4 % (ref 42–52)
HGB BLD-MCNC: 11.3 G/DL (ref 14–18)
INR PPP: 1 (ref 0.9–1.1)
MCH RBC QN AUTO: 29.1 PG (ref 25–34)
MCHC RBC AUTO-ENTMCNC: 32.8 G/DL (ref 32–36)
MCV RBC AUTO: 88.7 FL (ref 80–100)
PMV BLD AUTO: 10.2 FL (ref 7.4–10.4)
POTASSIUM SERPL-SCNC: 4.6 MMOL/L (ref 3.5–5.1)
RED CELL DISTRIBUTION WIDTH CV: 13.4 % (ref 11.5–14.5)
RED CELL DISTRIBUTION WIDTH SD: 43.7 FL (ref 36.4–46.3)
SODIUM SERPL-SCNC: 135 MMOL/L (ref 136–145)
WBC # BLD AUTO: 9.8 K/UL (ref 4.8–10.8)

## 2018-01-04 RX ADMIN — MORPHINE SULFATE PRN MG: 4 INJECTION, SOLUTION INTRAMUSCULAR; INTRAVENOUS at 03:09

## 2018-01-04 RX ADMIN — OXYCODONE HYDROCHLORIDE PRN MG: 5 TABLET ORAL at 12:35

## 2018-01-04 RX ADMIN — OXYCODONE HYDROCHLORIDE PRN MG: 5 TABLET ORAL at 03:56

## 2018-01-04 RX ADMIN — OXYCODONE HYDROCHLORIDE PRN MG: 5 TABLET ORAL at 08:34

## 2018-01-04 RX ADMIN — SODIUM CHLORIDE SCH MG: 9 INJECTION INTRAMUSCULAR; INTRAVENOUS; SUBCUTANEOUS at 06:00

## 2018-01-04 RX ADMIN — DOCUSATE SODIUM SCH MG: 100 CAPSULE, LIQUID FILLED ORAL at 08:31

## 2018-01-04 NOTE — DISCHARGE SUMMARY
CHIEF COMPLAINT:  Right knee pain.

 

HISTORY OF PRESENT ILLNESS:  The patient is admitted for elective right total

knee replacement for end-stage osteoarthritis of his right knee.  He has

failed conservative management.

 

PAST MEDICAL HISTORY:  Remarkable for elevated cholesterol, obesity, GERD,

kidney stones and BPH.

 

PAST SURGICAL HISTORY:  Include right shoulder arthroscopy.

 

SOCIAL HISTORY:  Reveals he is employed.  .  No tobacco, social

alcohol use.

 

FAMILY HISTORY:  Noncontributory.

 

ALLERGIES:  None.

 

PREADMISSION MEDICATIONS:  Include omeprazole 20 mg 2 tablets daily,

Paroxetine 40 mg p.o. daily, Tylenol p.r.n..  He will be discharged on p.r.n.

Percocet and Coumadin to keep INR 1.8-2.2.  If his INR is 1.5 or less today,

discharge on 4 mg a day.  If INR is greater than 1.5, discharge on 2 mg a

day.  If INR is greater than 2.0, discharge on 0.5 mg a day.

 

REVIEW OF SYSTEMS:  Noncontributory.

 

PHYSICAL EXAMINATION:  Reveals a clean, dry incision.  Neurovascularly check,

femoral sciatic nerve is normal.  Calves were nontender.  Abdomen, nontender.

 He has been up ambulatory.  He is voiding.  He is eating.

 

X-rays look good.

 

ASSESSMENT:  Overall, doing well status post right total knee replacement.

 

PLAN:  Discharge to home today with home services.  See Coumadin

recommendations above.

## 2018-01-04 NOTE — PROGRESS NOTE
DATE: 01/04/2018

 

Postop day #1 status post right total knee replacement.

 

SUBJECTIVE:  The patient is doing well.  Did not sleep based on the SCDs. 

Overall, pain is well managed.

 

Denies chest pain, shortness of breath, fever, chills, nausea, vomiting or

headache.  Has been up out of bed.  He has voided.  He is eating.

 

OBJECTIVE:  Vital signs are stable.  He is afebrile.  Abdomen soft.  Calf is

nontender.  Neurovascular check, femoral sciatic nerve is good.  Can do a

straight leg raise, can easily bend to 60-70 degrees.

 

The a.m. laboratory work is pending.

 

ASSESSMENT:  Overall, doing well.  Discharge to home today with padded

dressing.  Keep flexion 0-90 degrees for the first 2 weeks, do not go beyond.

 Discharge on 4 mg of Coumadin daily if INR is less than 1.5.  INR is greater

than 1.5, discharge on 2 mg daily.  If INR is greater than 2.0, discharge on

1/2 tablet daily.  Check INR on Monday.  Follow up in 2 weeks for staple

removal.  Laboratory work pending.

## 2018-01-04 NOTE — ORTHOPEDIC PROGRESS NOTE
Orthopedic Progress Note


Date of Service


Jan 4, 2018.





Subjective


Post OP Day:  1


Reports: feeling well, complaints (mild pain in right knee/thigh), pain 

controlled w PO medications, Denies: chest pain, SOB, nausea / vomiting, light 

headedness, calf pain





Objective


calves soft nontender, N/V intact, capillary refill less than 2 sec., incision C

/D/I, A&O x3, toes mobile


dressing with mild bloody serous drainage, incision intact, no active drainage, 

new dressings applied.  D/N intact.











  Date Time  Temp Pulse Resp B/P (MAP) Pulse Ox O2 Delivery O2 Flow Rate FiO2


 


1/4/18 08:57 36.6 102 20  99 Room Air  


 


1/4/18 07:15      Room Air  


 


1/4/18 07:06 36.6 102 20 105/67 (80) 99 Room Air  


 


1/4/18 03:01 36.8 93 18 112/69 (83) 94 Room Air  


 


1/3/18 23:57      Room Air  


 


1/3/18 23:20 36.7 107 19 105/68 (80) 95 Room Air  


 


1/3/18 19:52 36.9 110 20 120/78 (92) 94 Room Air  


 


1/3/18 15:31 36.6 91 16 106/71 (83) 97 Nasal Cannula 2.0 


 


1/3/18 14:15 36.6 97 17 115/76 (89) 97 Nasal Cannula 2.0 


 


1/3/18 13:12 37.0 104 18 120/82 (95) 94 Nasal Cannula 2.0 


 


1/3/18 12:11 36.4 77 18 120/76 (91) 97 Nasal Cannula 2.0 


 


1/3/18 11:42 36.5 90 19 114/75 (88) 95 Nasal Cannula 2.0 


 


1/3/18 11:15     99 Nasal Cannula 2.0 


 


1/3/18 11:15     99 Nasal Cannula 2.0 


 


1/3/18 11:15 36.5 99 16 117/77 (90) 99 Nasal Cannula 2.0 


 


1/3/18 11:00  100 20 122/72 96 Nasal Cannula 2 


 


1/3/18 10:50  98 14 114/71 99 Nasal Cannula 2 


 


1/3/18 10:40 36.9 96 14 100/72 98 Nasal Cannula 2 


 


1/3/18 10:30  115 17 107/73 97 Nasal Cannula 2 


 


1/3/18 10:22 36.7 129 17 105/71 96 Nasal Cannula 2 








Laboratory Results 24 Hours:











Test


  1/4/18


05:54


 


Hematocrit 34.4 % 


 


Hemoglobin 11.3 g/dL 


 


Prothromb Time International


Ratio 1.0 


 


 


Prothrombin Time 10.6 SECONDS 











Assessment & Plan


Assessment:


POD 1 right TKA


Acute blood loss anemia


Plan:


H/H Stable


Weight-bear as tolerated right lower extremity/out of bed as tolerated with the 

assistance of a walker.


PT/OT today.


Regular diet as ordered


Coumadin 6 weeks postoperatively for DVT prophylaxis.  Continue teds and SCDs.


Ice and elevate as needed for pain and swelling


DC immobilizer today


Pain medication as prescribed.


Plan for discharge to home with home health today.  Discharge instructions 

provided.


Findings discussed with Dr. Byrd.


Follow-up with Dr. Byrd as scheduled.








Discharge Planning


Discharge Planning:  home with home health


Pain Management:  Percocet


DVT Prophylaxis:  TEDs, Coumadin


Therapy:  Physical Therapy, Occupational Therapy

## 2018-01-04 NOTE — ANESTHESIOLOGY PROGRESS NOTE
Anesthesia Post Op Note


Date & Time


Jan 4, 2018 at 08:51





Vital Signs


Pain Intensity:  6.5





Vital Signs Past 12 Hours








  Date Time  Temp Pulse Resp B/P (MAP) Pulse Ox O2 Delivery O2 Flow Rate FiO2


 


1/4/18 07:15      Room Air  


 


1/4/18 07:06 36.6 102 20 105/67 (80) 99 Room Air  


 


1/4/18 03:01 36.8 93 18 112/69 (83) 94 Room Air  


 


1/3/18 23:57      Room Air  


 


1/3/18 23:20 36.7 107 19 105/68 (80) 95 Room Air  











Notes


Mental Status:  alert / awake / arousable, participated in evaluation


Pt Amnestic to Procedure:  Yes


Nausea / Vomiting:  adequately controlled


Pain:  adequately controlled


Airway Patency, RR, SpO2:  stable & adequate


BP & HR:  stable & adequate


Hydration State:  stable & adequate


Anesthetic Complications:  no major complications apparent

## 2018-01-08 ENCOUNTER — HOSPITAL ENCOUNTER (OUTPATIENT)
Dept: HOSPITAL 45 - C.LABSPEC | Age: 53
Discharge: HOME | End: 2018-01-08
Attending: ORTHOPAEDIC SURGERY
Payer: COMMERCIAL

## 2018-01-08 DIAGNOSIS — Z01.89: Primary | ICD-10-CM

## 2018-01-08 LAB — INR PPP: 1 (ref 0.9–1.1)

## 2018-01-09 NOTE — CODING QUERY NO DIAGNOSIS
***Valid Physician Order Needed***

 65



A valid physician order must be submitted in order to properly bill for the service(s) 
provided, including date of service(s), valid diagnosis, and physician signature. If these 
tests are done on a recurring basis the original physician order must be submitted in 
order to code and bill for the service(s) provided.



****Please fax us the original, signed physician order so that we may expedite billing to 
[phone]



DOS 2018

* PROTHROMBIN TIME





Thank you  

[facility] Information Management

Phone:  [phone]

Fax:  [phone]

## 2018-01-12 ENCOUNTER — HOSPITAL ENCOUNTER (OUTPATIENT)
Dept: HOSPITAL 45 - C.LABSPEC | Age: 53
Discharge: HOME | End: 2018-01-12
Attending: ORTHOPAEDIC SURGERY
Payer: COMMERCIAL

## 2018-01-12 DIAGNOSIS — Z51.81: ICD-10-CM

## 2018-01-12 DIAGNOSIS — Z96.60: Primary | ICD-10-CM

## 2018-01-12 DIAGNOSIS — Z79.01: ICD-10-CM

## 2018-01-12 LAB — INR PPP: 1.7 (ref 0.9–1.1)

## 2018-01-19 ENCOUNTER — HOSPITAL ENCOUNTER (OUTPATIENT)
Dept: HOSPITAL 45 - C.LABSPEC | Age: 53
Discharge: HOME | End: 2018-01-19
Attending: ORTHOPAEDIC SURGERY
Payer: COMMERCIAL

## 2018-01-19 DIAGNOSIS — Z79.01: Primary | ICD-10-CM

## 2018-01-19 DIAGNOSIS — Z96.651: ICD-10-CM

## 2018-01-19 DIAGNOSIS — Z51.81: ICD-10-CM

## 2018-01-19 LAB — INR PPP: 1.7 (ref 0.9–1.1)

## 2018-01-26 ENCOUNTER — HOSPITAL ENCOUNTER (OUTPATIENT)
Dept: HOSPITAL 45 - C.LAB | Age: 53
Discharge: HOME | End: 2018-01-26
Attending: ORTHOPAEDIC SURGERY
Payer: COMMERCIAL

## 2018-01-26 DIAGNOSIS — Z51.81: ICD-10-CM

## 2018-01-26 DIAGNOSIS — Z79.01: Primary | ICD-10-CM

## 2018-01-26 LAB — INR PPP: 2.1 (ref 0.9–1.1)

## 2018-02-09 NOTE — CODING QUERY NO DIAGNOSIS
TREATMENT RENDERED WITHOUT A DIAGNOSIS                                                  



To promote full compliance with coding requirements relating to patient care, physician 
participation is requested in all cases of  uncertainty.  Please assist us with 
providing a diagnosis/symptom for the test(s) below:



A diagnosis/symptom was not documented on your Order.  A valid diagnosis/symptom is 
required to bill all insurances.



**Please remember that we are unable to code a diagnosis of rule out, probable, possible, 
questionable, or suspected.  



Tests that require a diagnosis:

DOS: 1/12/18



* PROTHROMBIN TIME PROFILE      DIAGNOSIS:









Provider Signature: _____________________________ Date: _________



Thank you  

Kezia Moore

[employer] Information Management

Phone:  [phone]

Fax:  [phone]



Once completed, please kindly fax back to [phone]



For questions please call [phone]

## 2018-02-12 ENCOUNTER — HOSPITAL ENCOUNTER (OUTPATIENT)
Dept: HOSPITAL 45 - C.RDSM | Age: 53
Discharge: HOME | End: 2018-02-12
Attending: ORTHOPAEDIC SURGERY
Payer: COMMERCIAL

## 2018-02-12 DIAGNOSIS — Z96.659: Primary | ICD-10-CM

## 2018-08-20 ENCOUNTER — HOSPITAL ENCOUNTER (EMERGENCY)
Dept: HOSPITAL 45 - C.EDB | Age: 53
Discharge: HOME | End: 2018-08-20
Payer: COMMERCIAL

## 2018-08-20 VITALS
HEIGHT: 72.01 IN | HEIGHT: 72.01 IN | WEIGHT: 282.41 LBS | WEIGHT: 282.41 LBS | BODY MASS INDEX: 38.25 KG/M2 | BODY MASS INDEX: 38.25 KG/M2

## 2018-08-20 VITALS
TEMPERATURE: 98.42 F | DIASTOLIC BLOOD PRESSURE: 88 MMHG | OXYGEN SATURATION: 98 % | HEART RATE: 81 BPM | SYSTOLIC BLOOD PRESSURE: 135 MMHG
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
## 2018-08-20 DIAGNOSIS — S61.211A: Primary | ICD-10-CM

## 2018-08-20 DIAGNOSIS — Z79.899: ICD-10-CM

## 2018-08-20 DIAGNOSIS — Z23: ICD-10-CM

## 2018-08-20 NOTE — EMERGENCY ROOM VISIT NOTE
ED Visit Note


First contact with patient:  15:29


CHIEF COMPLAINT: Left index finger laceration





HISTORY OF PRESENT ILLNESS: This 52-year-old male presents to ER with chief 

complaint of left index finger laceration.  The patient states that he cut it 

on a piece of metal while working today.  The patient states the bleeding has 

stopped.  The patient's tetanus status is unknown.  The patient is not on any 

blood thinners.  The patient is right-hand dominant.





REVIEW OF SYSTEMS:   6 system review was performed and was negative unless 

stated otherwise in history of present illness.





PMH:  The patient is healthy; there is no significant medical or surgical 

history.





SOCIAL HISTORY:  Patient lives at home.  Non-smoker, occasional alcohol use.


  


PHYSICAL EXAM: Vital Signs: Were reviewed reviewed Nurse's notes.  GENERAL: 52-

year-old male appears in no acute distress.  MENTAL Status: Alert and oriented 

3.  LEFT INDEX FINGER: There is a 2.5 cm long laceration on the dorsal aspect 

of the proximal phalanx r.  The edges gape apart with traction.  There is no 

foreign material in the wound and it looks clean. There is no bleeding.  No 

deep structures such as tendons or nerves are seen in the base of the wound.  

Extension of the finger is full and strong.





EMERGENCY DEPARTMENT COURSE: The patient was evaluated.  Adacel was given.


Wound Repair: 


Complexity: Basic.


Verbal consent was obtained after the risks and benefits were explained, 

including but not limited to bleeding, scarring, infection, pain, and bone/joint

/nerve damage. 


The skin was prepped with betadine and a sterile field set.  


The wound was anesthetized with 4.8 ml of 1% buffered lidocaine.  


With direct pressure the bleeding subsided. 


Copious irrigation was performed using sterile saline.  


The wound was explored for foreign bodies and none found.  


Debridement was not performed.  


The wound edges were approximated using 5-0 Ethilon with 5simple interrupted 

sutures.  


Hemostasis and excellent approximation was achieved.  


Antibacterial ointment and a sterile dressing applied.  


Detailed wound care instructions and signs and symptoms of infection reviewed 

with the patient.  


No complications and the patient tolerated the procedure well.


 


DIAGNOSIS:    2.5 cm left index finger laceration





Discharge INSTRUCTIONS: Keep wound clean and dry.  No water on the area for 12-

24 hrs then no soaking until sutures removed.  Do not allow any crusting or 

dried blood to accumulate on sutures.  If this occurs, use a 1:1 solution of 

hydrogen peroxide/water on a Q-tip to clean the wound.  Use an antibiotic 

ointment for 3-4 days, then let wound dry.  Suture removal in 8 days.  Follow 

up sooner for any signs of infection (increasing redness, swelling, drainage).  

Ice and elevate for swelling and pain.  Tylenol 650 mg every 6 hrs for pain.  

Keep covered when in sun until sutures removed then SPF 50 or higher for one 

year.  Vitamin E oil if desired two weeks after suture removal for reduction of 

scar.


Current/Historical Medications


Scheduled


Docusate Sodium (Docusate Sodium), 100 MG PO BID


Multiple Vitamin (Daily-Daniela), 1 TAB PO QAM


Omeprazole (Prilosec), 40 MG PO HS


Paroxetine (Paxil), 40 MG PO HS





Scheduled PRN


Fluticasone Prop/Salmeterol (Advair Diskus 250/50 60 Dose), 1 PUFF INH BID PRN 

for Wheezing


Potassium (Potassium), 1 TAB PO HS PRN for PRN





Allergies


Coded Allergies:  


     No Known Allergies (Verified , 12/21/17)





Vital Signs











  Date Time  Temp Pulse Resp B/P (MAP) Pulse Ox O2 Delivery O2 Flow Rate FiO2


 


8/20/18 15:27 36.9 78 18 133/90 98 Room Air  











Medications Administered











 Medications


  (Trade)  Dose


 Ordered  Sig/Maria De Jesus


 Route  Start Time


 Stop Time Status Last Admin


Dose Admin


 


 Diphtheria/


 Pertussis/Tetanus


 Vacc


  (Adacel Inj)  0.5 ml  ONCE ONCE


 IM.  8/20/18 15:45


 8/20/18 15:46 DC 8/20/18 15:41


0.5 ML











Departure Information


Referrals


Hilario Griffin M.D. (PCP)





Patient Instructions


My [facility]

## 2021-07-08 NOTE — CODING QUERY NO DIAGNOSIS
PLEASE PROVIDE COPY OF PHYSICIAN ORDER



To promote full compliance with coding requirements relating to patient care, physician 
participation is requested in all cases of  uncertainty.  Please assist us with 
providing a copy of the original physician order for the following services that were 
rendered on 1/12/18:



PT/INR





Thank you  

Kezia Moore

[employer]

Phone:  [phone]

Fax:  [phone]



Once completed, please kindly fax back to [phone]



For questions please call [phone] [Fully active, able to carry on all pre-disease performance without restriction] : Status 0 - Fully active, able to carry on all pre-disease performance without restriction [Normal] : affect appropriate [de-identified] : left breast without dominant mass or nipple discharge; right reconstructed breast without palpable abnormality